# Patient Record
Sex: FEMALE | Race: WHITE | NOT HISPANIC OR LATINO | Employment: OTHER | URBAN - METROPOLITAN AREA
[De-identification: names, ages, dates, MRNs, and addresses within clinical notes are randomized per-mention and may not be internally consistent; named-entity substitution may affect disease eponyms.]

---

## 2019-05-22 ENCOUNTER — OFFICE VISIT (OUTPATIENT)
Dept: GASTROENTEROLOGY | Facility: CLINIC | Age: 84
End: 2019-05-22
Payer: MEDICARE

## 2019-05-22 VITALS
HEART RATE: 64 BPM | SYSTOLIC BLOOD PRESSURE: 147 MMHG | HEIGHT: 59 IN | DIASTOLIC BLOOD PRESSURE: 96 MMHG | WEIGHT: 84 LBS | BODY MASS INDEX: 16.93 KG/M2

## 2019-05-22 DIAGNOSIS — R11.0 CHRONIC NAUSEA: ICD-10-CM

## 2019-05-22 DIAGNOSIS — K57.30 DIVERTICULOSIS OF LARGE INTESTINE WITHOUT HEMORRHAGE: ICD-10-CM

## 2019-05-22 DIAGNOSIS — K59.04 CHRONIC IDIOPATHIC CONSTIPATION: Primary | ICD-10-CM

## 2019-05-22 DIAGNOSIS — K21.9 GASTROESOPHAGEAL REFLUX DISEASE WITHOUT ESOPHAGITIS: ICD-10-CM

## 2019-05-22 DIAGNOSIS — Z87.19 HISTORY OF ESOPHAGEAL STRICTURE: ICD-10-CM

## 2019-05-22 PROCEDURE — 99999 PR PBB SHADOW E&M-EST. PATIENT-LVL II: ICD-10-PCS | Mod: PBBFAC,,, | Performed by: INTERNAL MEDICINE

## 2019-05-22 PROCEDURE — 99203 PR OFFICE/OUTPT VISIT, NEW, LEVL III, 30-44 MIN: ICD-10-PCS | Mod: S$PBB,,, | Performed by: INTERNAL MEDICINE

## 2019-05-22 PROCEDURE — 99203 OFFICE O/P NEW LOW 30 MIN: CPT | Mod: S$PBB,,, | Performed by: INTERNAL MEDICINE

## 2019-05-22 PROCEDURE — 99212 OFFICE O/P EST SF 10 MIN: CPT | Mod: PBBFAC,PN | Performed by: INTERNAL MEDICINE

## 2019-05-22 PROCEDURE — 99999 PR PBB SHADOW E&M-EST. PATIENT-LVL II: CPT | Mod: PBBFAC,,, | Performed by: INTERNAL MEDICINE

## 2019-05-22 RX ORDER — ONDANSETRON 4 MG/1
4 TABLET, FILM COATED ORAL EVERY 8 HOURS PRN
COMMUNITY
End: 2019-05-22 | Stop reason: SDUPTHER

## 2019-05-22 RX ORDER — OMEPRAZOLE 20 MG/1
20 CAPSULE, DELAYED RELEASE ORAL DAILY
Qty: 90 CAPSULE | Refills: 1 | Status: SHIPPED | OUTPATIENT
Start: 2019-05-22 | End: 2020-03-02

## 2019-05-22 RX ORDER — OMEPRAZOLE 20 MG/1
20 CAPSULE, DELAYED RELEASE ORAL DAILY
COMMUNITY
End: 2019-05-22 | Stop reason: SDUPTHER

## 2019-05-22 RX ORDER — ONDANSETRON 4 MG/1
4 TABLET, FILM COATED ORAL EVERY 8 HOURS PRN
Qty: 100 TABLET | Refills: 1 | Status: SHIPPED | OUTPATIENT
Start: 2019-05-22 | End: 2020-08-25

## 2019-05-22 RX ORDER — SOTALOL HYDROCHLORIDE 80 MG/1
TABLET ORAL
Refills: 2 | COMMUNITY
Start: 2019-04-10

## 2019-05-22 RX ORDER — OXYCODONE AND ACETAMINOPHEN 10; 325 MG/1; MG/1
TABLET ORAL
Refills: 0 | COMMUNITY
Start: 2019-04-10

## 2019-05-22 RX ORDER — FAMOTIDINE 40 MG/1
40 TABLET, FILM COATED ORAL DAILY
COMMUNITY
End: 2019-05-22 | Stop reason: SDUPTHER

## 2019-05-22 RX ORDER — FUROSEMIDE 20 MG/1
20 TABLET ORAL 2 TIMES DAILY
COMMUNITY

## 2019-05-22 RX ORDER — BUDESONIDE AND FORMOTEROL FUMARATE DIHYDRATE 80; 4.5 UG/1; UG/1
AEROSOL RESPIRATORY (INHALATION)
COMMUNITY
Start: 2019-05-20

## 2019-05-22 RX ORDER — LEVOTHYROXINE SODIUM 88 UG/1
TABLET ORAL
COMMUNITY
Start: 2019-04-16

## 2019-05-22 RX ORDER — FAMOTIDINE 40 MG/1
40 TABLET, FILM COATED ORAL NIGHTLY
Qty: 90 TABLET | Refills: 1 | Status: SHIPPED | OUTPATIENT
Start: 2019-05-22 | End: 2020-03-23 | Stop reason: SDUPTHER

## 2019-05-22 RX ORDER — LEVOTHYROXINE SODIUM 75 UG/1
TABLET ORAL
Refills: 1 | COMMUNITY
Start: 2019-05-20

## 2019-05-22 RX ORDER — UMECLIDINIUM 62.5 UG/1
AEROSOL, POWDER ORAL
COMMUNITY
Start: 2019-05-20

## 2019-05-22 NOTE — PROGRESS NOTES
Subjective:       Patient ID: Katlyn Mathis is a 84 y.o. female.    Chief Complaint: No chief complaint on file.    She has become constipated.  In January she fell and had a fracture of her right wrist and femur and underwent surgery and was in the nursing home.  In the past the Linzess has worked but she does not have a prescription.  She is on the oxycodone for the pain. She is in the wheelchair today but she states that she is undergoing physical therapy and started walking with her walker.  She has a history gastroesophageal reflux and stricture.  The current medications have resolved her pyrosis and dyspepsia and she denies dysphagia aspiration jaundice hematemesis melena or hematochezia. She has was evaluated by her pulmonologist for COPD and her pulmonary nodules.  I have requested the labs and records.  She takes the omeprazole in the morning and the Pepcid at night.      Allergies:  Review of patient's allergies indicates:   Allergen Reactions    Adhesive     Chocolate flavor     Codeine     Cortisone     Flexeril [cyclobenzaprine]     Lidocaine     Niacin preparations     Pcn [penicillins]     Peanut     Sulfa (sulfonamide antibiotics)        Medications:    Current Outpatient Medications:     famotidine (PEPCID) 40 MG tablet, Take 1 tablet (40 mg total) by mouth nightly., Disp: 90 tablet, Rfl: 1    furosemide (LASIX) 20 MG tablet, Take 20 mg by mouth 2 (two) times daily., Disp: , Rfl:     INCRUSE ELLIPTA 62.5 mcg/actuation DsDv, , Disp: , Rfl:     levothyroxine (SYNTHROID) 75 MCG tablet, , Disp: , Rfl: 1    levothyroxine (SYNTHROID) 88 MCG tablet, , Disp: , Rfl:     omeprazole (PRILOSEC) 20 MG capsule, Take 1 capsule (20 mg total) by mouth once daily., Disp: 90 capsule, Rfl: 1    ondansetron (ZOFRAN) 4 MG tablet, Take 1 tablet (4 mg total) by mouth every 8 (eight) hours as needed for Nausea., Disp: 100 tablet, Rfl: 1    oxyCODONE-acetaminophen (PERCOCET)  mg per tablet, ,  Disp: , Rfl: 0    sotalol (BETAPACE) 80 MG tablet, , Disp: , Rfl: 2    SYMBICORT 80-4.5 mcg/actuation HFAA, , Disp: , Rfl:     History reviewed. No pertinent past medical history.    History reviewed. No pertinent surgical history.      Review of Systems   Constitutional: Positive for activity change. Negative for appetite change, fever and unexpected weight change.   HENT: Negative for trouble swallowing.         No jaundice.   Respiratory: Negative for cough, shortness of breath and wheezing.         She has a pulmonary inhaler.  She denies cardiopulmonary symptoms but she is not physically active.   Cardiovascular: Negative for chest pain.        She has history of atrial fibrillation.   Gastrointestinal: Positive for constipation. Negative for abdominal distention, abdominal pain, anal bleeding and blood in stool.        She complains of constipation and nausea at night.  She denies vomiting.  She denies a precipitating factor   Endocrine:        She states her hypothyroidism is well controlled   Genitourinary:        Nocturia   Musculoskeletal: Positive for arthralgias and back pain. Negative for neck pain.   Skin: Negative for pallor and rash.   Neurological: Negative for dizziness, seizures, syncope, speech difficulty, weakness and numbness.   Hematological: Negative for adenopathy.   Psychiatric/Behavioral: Negative for confusion.       Objective:      Physical Exam   Constitutional: She is oriented to person, place, and time.   Thin elderly well-hydrated nonicteric white female   HENT:   Head: Normocephalic.   Eyes: Pupils are equal, round, and reactive to light. EOM are normal.   Neck: Normal range of motion. Neck supple. No tracheal deviation present. No thyromegaly present.   Cardiovascular:   Irregular irregular rhythm   Pulmonary/Chest: Effort normal and breath sounds normal.   Abdominal: Soft. Bowel sounds are normal. She exhibits no distension and no mass. There is no tenderness. There is no  rebound and no guarding. No hernia.   The abdomen is on plane without tenderness masses or organomegaly.  Bowel sounds are normal.   Musculoskeletal:   She does do decreased movement of the right hip or right leg and the right wrist.  She has healed surgical scars   Lymphadenopathy:     She has no cervical adenopathy.   Neurological: She is alert and oriented to person, place, and time. No cranial nerve deficit.   Skin: Skin is warm and dry.   Psychiatric: She has a normal mood and affect. Her behavior is normal.   Vitals reviewed.        Plan:       Chronic idiopathic constipation    Diverticulosis of large intestine without hemorrhage    Gastroesophageal reflux disease without esophagitis    History of esophageal stricture    Other orders  -     famotidine (PEPCID) 40 MG tablet; Take 1 tablet (40 mg total) by mouth nightly.  Dispense: 90 tablet; Refill: 1  -     omeprazole (PRILOSEC) 20 MG capsule; Take 1 capsule (20 mg total) by mouth once daily.  Dispense: 90 capsule; Refill: 1  -     ondansetron (ZOFRAN) 4 MG tablet; Take 1 tablet (4 mg total) by mouth every 8 (eight) hours as needed for Nausea.  Dispense: 100 tablet; Refill: 1

## 2019-05-22 NOTE — PATIENT INSTRUCTIONS
She will continue her reflux regimen current medications.  She follows up with her pulmonologist.  She will continue her physical therapy and continue her ambulation program.  She is given samples of Linzess 290 for the constipation. I have requested the labs and the pulmonologist office visits.

## 2019-06-06 ENCOUNTER — TELEPHONE (OUTPATIENT)
Dept: GASTROENTEROLOGY | Facility: CLINIC | Age: 84
End: 2019-06-06

## 2019-06-06 NOTE — TELEPHONE ENCOUNTER
----- Message from Ingrid Tafoya RT sent at 6/6/2019  9:21 AM CDT -----  Contact: Nilton,Daughter,[phone]  Nilton,Daughter,[phone], would like the prescription of the pt's sample,Marquiszest, to be called in, please call to confirm, thanks.

## 2019-06-26 ENCOUNTER — OFFICE VISIT (OUTPATIENT)
Dept: GASTROENTEROLOGY | Facility: CLINIC | Age: 84
End: 2019-06-26
Payer: MEDICARE

## 2019-06-26 VITALS
OXYGEN SATURATION: 96 % | HEART RATE: 61 BPM | TEMPERATURE: 98 F | BODY MASS INDEX: 16.93 KG/M2 | HEIGHT: 59 IN | WEIGHT: 84 LBS | DIASTOLIC BLOOD PRESSURE: 74 MMHG | SYSTOLIC BLOOD PRESSURE: 122 MMHG

## 2019-06-26 DIAGNOSIS — K21.9 GASTROESOPHAGEAL REFLUX DISEASE WITHOUT ESOPHAGITIS: ICD-10-CM

## 2019-06-26 DIAGNOSIS — K59.04 CHRONIC IDIOPATHIC CONSTIPATION: Primary | ICD-10-CM

## 2019-06-26 DIAGNOSIS — K59.00 CONSTIPATION, UNSPECIFIED CONSTIPATION TYPE: ICD-10-CM

## 2019-06-26 PROCEDURE — 99999 PR PBB SHADOW E&M-EST. PATIENT-LVL III: ICD-10-PCS | Mod: PBBFAC,,, | Performed by: INTERNAL MEDICINE

## 2019-06-26 PROCEDURE — 99213 OFFICE O/P EST LOW 20 MIN: CPT | Mod: PBBFAC,PN | Performed by: INTERNAL MEDICINE

## 2019-06-26 PROCEDURE — 99999 PR PBB SHADOW E&M-EST. PATIENT-LVL III: CPT | Mod: PBBFAC,,, | Performed by: INTERNAL MEDICINE

## 2019-06-26 PROCEDURE — 99213 OFFICE O/P EST LOW 20 MIN: CPT | Mod: S$PBB,,, | Performed by: INTERNAL MEDICINE

## 2019-06-26 PROCEDURE — 99213 PR OFFICE/OUTPT VISIT, EST, LEVL III, 20-29 MIN: ICD-10-PCS | Mod: S$PBB,,, | Performed by: INTERNAL MEDICINE

## 2019-06-26 RX ORDER — RIVAROXABAN 15 MG/1
TABLET, FILM COATED ORAL
COMMUNITY
Start: 2019-06-11

## 2019-06-26 RX ORDER — ALBUTEROL SULFATE 1.25 MG/3ML
1.25 SOLUTION RESPIRATORY (INHALATION) EVERY 6 HOURS PRN
COMMUNITY
End: 2020-05-18

## 2019-06-26 NOTE — PROGRESS NOTES
Subjective:       Patient ID: Katlyn Mathis is a 84 y.o. female.    Chief Complaint: Follow-up    She is here with her family member who is her main caregiver who states she is eating well.  She is eating lot of fresh vegetables.  She denies dysphagia or associated coughing with oral ingested liquids or foods.  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  She is followed by the pulmonologist.  She ambulates with assistance.  She denies fever chills.  The Linzess a produced daily bowel movements.  She does not want further GI evaluation because she is doing so very well.  I have requested the records.      Allergies:  Review of patient's allergies indicates:   Allergen Reactions    Adhesive     Chocolate flavor     Codeine     Cortisone     Flexeril [cyclobenzaprine]     Lidocaine     Niacin preparations     Pcn [penicillins]     Peanut     Sulfa (sulfonamide antibiotics)        Medications:    Current Outpatient Medications:     albuterol (ACCUNEB) 1.25 mg/3 mL Nebu, Take 1.25 mg by nebulization every 6 (six) hours as needed. Rescue, Disp: , Rfl:     famotidine (PEPCID) 40 MG tablet, Take 1 tablet (40 mg total) by mouth nightly., Disp: 90 tablet, Rfl: 1    furosemide (LASIX) 20 MG tablet, Take 20 mg by mouth 2 (two) times daily., Disp: , Rfl:     INCRUSE ELLIPTA 62.5 mcg/actuation DsDv, , Disp: , Rfl:     levothyroxine (SYNTHROID) 75 MCG tablet, , Disp: , Rfl: 1    levothyroxine (SYNTHROID) 88 MCG tablet, , Disp: , Rfl:     omeprazole (PRILOSEC) 20 MG capsule, Take 1 capsule (20 mg total) by mouth once daily., Disp: 90 capsule, Rfl: 1    ondansetron (ZOFRAN) 4 MG tablet, Take 1 tablet (4 mg total) by mouth every 8 (eight) hours as needed for Nausea., Disp: 100 tablet, Rfl: 1    oxyCODONE-acetaminophen (PERCOCET)  mg per tablet, , Disp: , Rfl: 0    sotalol (BETAPACE) 80 MG tablet, , Disp: , Rfl:  2    SYMBICORT 80-4.5 mcg/actuation HFAA, , Disp: , Rfl:     XARELTO 15 mg Tab, , Disp: , Rfl:     linaCLOtide (LINZESS) 290 mcg Cap capsule, Take 1 capsule (290 mcg total) by mouth once daily., Disp: 30 capsule, Rfl: 2    History reviewed. No pertinent past medical history.    History reviewed. No pertinent surgical history.      Review of Systems   Constitutional: Positive for activity change. Negative for appetite change, fever and unexpected weight change.        She is not of she is not physically active because of her arthritis and decreased muscle mass with weakness.   HENT: Negative for trouble swallowing.         No jaundice.   Respiratory: Negative for cough, shortness of breath and wheezing.         She has chronic lung disease and is followed by the pulmonologist.  She has never been a smoker.  She denies dyspnea with exertion but she is not physically active  She denies aspiration or dysphagia.  She states the pulmonary inhalers have really helped her.   Cardiovascular: Negative for chest pain.   Gastrointestinal: Positive for constipation. Negative for abdominal distention, abdominal pain, anal bleeding, blood in stool and diarrhea.        She has history of diverticulosis and constipation.  The Linzess as produced daily bowel movements.  She has a history gastroesophageal reflux and dysphagia and she was dilated years ago.  She denies dysphagia aspiration jaundice or bleeding.  She is able to eat foods including vegetables without aspiration such as coughing or dyspnea. She experienced nausea but now that the constipation is resolved the nausea has resolved.  She denies vomiting   Musculoskeletal: Positive for arthralgias and back pain. Negative for neck pain.   Skin: Negative for pallor and rash.   Neurological: Negative for dizziness, seizures, syncope, speech difficulty, weakness and numbness.   Hematological: Negative for adenopathy.   Psychiatric/Behavioral: Negative for confusion.        Objective:      Physical Exam   Constitutional: She is oriented to person, place, and time.   Thin elderly nonicteric white female   HENT:   Head: Normocephalic.   Eyes: Pupils are equal, round, and reactive to light. EOM are normal.   Neck: Normal range of motion. Neck supple. No tracheal deviation present. No thyromegaly present.   Cardiovascular: Normal rate, regular rhythm and normal heart sounds.   Pulmonary/Chest: Effort normal and breath sounds normal.   Abdominal: Soft. Bowel sounds are normal. She exhibits no distension and no mass. There is no tenderness. There is no rebound and no guarding. No hernia.   On plane without tenderness masses organomegaly.  Bowel sounds are normal.   Musculoskeletal:   She is in the wheelchair.  She cannot go from the sitting to the standing position without assistance.   Lymphadenopathy:     She has no cervical adenopathy.   Neurological: She is alert and oriented to person, place, and time. No cranial nerve deficit.   Skin: Skin is warm and dry.   Psychiatric: She has a normal mood and affect. Her behavior is normal.   Vitals reviewed.        Plan:       Chronic idiopathic constipation  -     linaCLOtide (LINZESS) 290 mcg Cap capsule; Take 1 capsule (290 mcg total) by mouth once daily.  Dispense: 30 capsule; Refill: 2    Constipation, unspecified constipation type    Gastroesophageal reflux disease without esophagitis

## 2019-06-26 NOTE — PATIENT INSTRUCTIONS
She will continue her reflux regimen current medications.  She will continue her diet with additional fresh fruits.  And vitamins and minerals.  The Linzess is produced daily bowel movements at this point further GI evaluation is not indicated.  She will follow up with her pulmonologist who has ordered a video fluoroscopy.  She will let me know when she has the testing so I can review the it records.

## 2019-09-26 ENCOUNTER — OFFICE VISIT (OUTPATIENT)
Dept: GASTROENTEROLOGY | Facility: CLINIC | Age: 84
End: 2019-09-26
Payer: MEDICARE

## 2019-09-26 VITALS
SYSTOLIC BLOOD PRESSURE: 140 MMHG | BODY MASS INDEX: 17.14 KG/M2 | DIASTOLIC BLOOD PRESSURE: 71 MMHG | OXYGEN SATURATION: 95 % | HEIGHT: 59 IN | WEIGHT: 85 LBS | RESPIRATION RATE: 16 BRPM | HEART RATE: 59 BPM

## 2019-09-26 DIAGNOSIS — K57.30 DIVERTICULOSIS OF LARGE INTESTINE WITHOUT HEMORRHAGE: ICD-10-CM

## 2019-09-26 DIAGNOSIS — Z87.19 HISTORY OF ESOPHAGEAL STRICTURE: Primary | ICD-10-CM

## 2019-09-26 DIAGNOSIS — K59.00 CONSTIPATION, UNSPECIFIED CONSTIPATION TYPE: ICD-10-CM

## 2019-09-26 DIAGNOSIS — K21.9 GASTROESOPHAGEAL REFLUX DISEASE WITHOUT ESOPHAGITIS: ICD-10-CM

## 2019-09-26 PROCEDURE — 99213 PR OFFICE/OUTPT VISIT, EST, LEVL III, 20-29 MIN: ICD-10-PCS | Mod: S$PBB,,, | Performed by: INTERNAL MEDICINE

## 2019-09-26 PROCEDURE — 99213 OFFICE O/P EST LOW 20 MIN: CPT | Mod: PBBFAC,PN | Performed by: INTERNAL MEDICINE

## 2019-09-26 PROCEDURE — 99999 PR PBB SHADOW E&M-EST. PATIENT-LVL III: CPT | Mod: PBBFAC,,, | Performed by: INTERNAL MEDICINE

## 2019-09-26 PROCEDURE — 99213 OFFICE O/P EST LOW 20 MIN: CPT | Mod: S$PBB,,, | Performed by: INTERNAL MEDICINE

## 2019-09-26 PROCEDURE — 99999 PR PBB SHADOW E&M-EST. PATIENT-LVL III: ICD-10-PCS | Mod: PBBFAC,,, | Performed by: INTERNAL MEDICINE

## 2019-09-26 RX ORDER — AMLODIPINE BESYLATE 5 MG/1
TABLET ORAL
Refills: 4 | COMMUNITY
Start: 2019-09-03

## 2019-09-26 NOTE — PATIENT INSTRUCTIONS
She is doing extremely well.  The Linzess is resolved the constipation.  She uses the PPI in the morning the ranitidine at night.  She denies dysphagia upper GI symptoms.  She has pending labs.  She is followed by the cardiologist.  I have requested labs.  She continues her current diet for vitamins and minerals.

## 2019-09-27 NOTE — PROGRESS NOTES
Subjective:       Patient ID: Katlyn Mathis is a 85 y.o. female.    Chief Complaint: Follow-up    She is swallowing well. Her granddaughter is the main caregiver.  The granddaughter's preparing the food and states that she is eating very well. She denies aspiration.  She denies dysphagia hematemesis hematochezia jaundice or bleeding.  The PPI has resolved her upper GI symptoms.  She is having daily bowel movements with her current medications.  She has labs scheduled with her cardiologist.  She ambulates at home with a walker although the daughter brought her into the office with a wheelchair because I did more her to fall .  At home she uses the wheelchair but knows the area and does not fall.  She complains of chronic back and arthritic pain. She takes her medications as prescribed.  The granddaughter provides protein supplements and also vitamin and mineral supplements      Allergies:  Review of patient's allergies indicates:   Allergen Reactions    Adhesive     Chocolate flavor     Codeine     Cortisone     Flexeril [cyclobenzaprine]     Lidocaine     Niacin preparations     Pcn [penicillins]     Peanut     Sulfa (sulfonamide antibiotics)        Medications:    Current Outpatient Medications:     albuterol (ACCUNEB) 1.25 mg/3 mL Nebu, Take 1.25 mg by nebulization every 6 (six) hours as needed. Rescue, Disp: , Rfl:     amLODIPine (NORVASC) 5 MG tablet, , Disp: , Rfl: 4    famotidine (PEPCID) 40 MG tablet, Take 1 tablet (40 mg total) by mouth nightly., Disp: 90 tablet, Rfl: 1    furosemide (LASIX) 20 MG tablet, Take 20 mg by mouth 2 (two) times daily., Disp: , Rfl:     INCRUSE ELLIPTA 62.5 mcg/actuation DsDv, , Disp: , Rfl:     levothyroxine (SYNTHROID) 75 MCG tablet, , Disp: , Rfl: 1    levothyroxine (SYNTHROID) 88 MCG tablet, , Disp: , Rfl:     omeprazole (PRILOSEC) 20 MG capsule, Take 1 capsule (20 mg total) by mouth once daily., Disp: 90 capsule, Rfl: 1    ondansetron (ZOFRAN) 4 MG  tablet, Take 1 tablet (4 mg total) by mouth every 8 (eight) hours as needed for Nausea., Disp: 100 tablet, Rfl: 1    oxyCODONE-acetaminophen (PERCOCET)  mg per tablet, , Disp: , Rfl: 0    sotalol (BETAPACE) 80 MG tablet, , Disp: , Rfl: 2    SYMBICORT 80-4.5 mcg/actuation HFAA, , Disp: , Rfl:     XARELTO 15 mg Tab, , Disp: , Rfl:     History reviewed. No pertinent past medical history.    History reviewed. No pertinent surgical history.      Review of Systems   Constitutional: Negative for appetite change, fever and unexpected weight change.   HENT: Negative for trouble swallowing.         No jaundice.   Respiratory: Negative for cough, shortness of breath and wheezing.         She denies chronic cough dyspnea on exertion aspiration or chronic sputum production.  She denies hemoptysis.   Cardiovascular: Negative for chest pain.        She denies exertional chest pain or or rhythm disturbance.  She is not particularly physically active.   Gastrointestinal: Positive for constipation. Negative for abdominal distention, abdominal pain, anal bleeding, blood in stool, diarrhea, nausea, rectal pain and vomiting.   Musculoskeletal: Positive for arthralgias, back pain and neck stiffness. Negative for neck pain.   Skin: Negative for pallor and rash.   Neurological: Negative for dizziness, seizures, syncope, speech difficulty, weakness and numbness.   Hematological: Negative for adenopathy.   Psychiatric/Behavioral: Negative for confusion.       Objective:      Physical Exam   Constitutional: She is oriented to person, place, and time. She appears well-developed and well-nourished.   Thin elderly nonicteric white female   HENT:   Head: Normocephalic.   Eyes: Pupils are equal, round, and reactive to light. EOM are normal.   Neck: Normal range of motion. Neck supple. No tracheal deviation present. No thyromegaly present.   Cardiovascular: Normal rate, regular rhythm and normal heart sounds.   Pulmonary/Chest: Effort  normal and breath sounds normal.   Abdominal: Soft. Bowel sounds are normal. She exhibits no distension and no mass. There is no tenderness. There is no rebound and no guarding. No hernia.   The abdomen is soft is on plane without organomegaly tenderness or masses.  Bowel sounds are normal.   Musculoskeletal: Normal range of motion.   She is in the wheelchair.  She can get out of the wheelchair with assistance.  She she has difficulty going from the sitting to the standing position. Her gait is slow slow   Lymphadenopathy:     She has no cervical adenopathy.   Neurological: She is alert and oriented to person, place, and time. No cranial nerve deficit.   Skin: Skin is warm and dry.   Psychiatric: She has a normal mood and affect. Her behavior is normal.   Vitals reviewed.        Plan:       History of esophageal stricture    Gastroesophageal reflux disease without esophagitis    Constipation, unspecified constipation type    Diverticulosis of large intestine without hemorrhage     She will continue current medications vitamins and minerals.  She continues her reflux regimen.  She continues her nutritious diet.  Her granddaughter is the caregiver.  She will follow-up with her other physicians.  She will be supervised when she is using the walker.

## 2019-10-07 DIAGNOSIS — K59.04 CHRONIC IDIOPATHIC CONSTIPATION: ICD-10-CM

## 2019-10-07 RX ORDER — LINACLOTIDE 290 UG/1
CAPSULE, GELATIN COATED ORAL
Qty: 30 CAPSULE | Refills: 2 | Status: SHIPPED | OUTPATIENT
Start: 2019-10-07

## 2020-03-02 RX ORDER — OMEPRAZOLE 20 MG/1
CAPSULE, DELAYED RELEASE ORAL
Qty: 90 CAPSULE | Refills: 0 | Status: SHIPPED | OUTPATIENT
Start: 2020-03-02 | End: 2020-03-23 | Stop reason: SDUPTHER

## 2020-03-23 DIAGNOSIS — K21.9 GASTROESOPHAGEAL REFLUX DISEASE WITHOUT ESOPHAGITIS: Primary | ICD-10-CM

## 2020-03-23 RX ORDER — OMEPRAZOLE 20 MG/1
20 CAPSULE, DELAYED RELEASE ORAL DAILY
Qty: 90 CAPSULE | Refills: 3 | Status: SHIPPED | OUTPATIENT
Start: 2020-03-23 | End: 2020-05-18 | Stop reason: SDUPTHER

## 2020-03-23 RX ORDER — FAMOTIDINE 40 MG/1
40 TABLET, FILM COATED ORAL NIGHTLY
Qty: 90 TABLET | Refills: 1 | Status: SHIPPED | OUTPATIENT
Start: 2020-03-23 | End: 2020-05-18 | Stop reason: SDUPTHER

## 2020-05-18 ENCOUNTER — OFFICE VISIT (OUTPATIENT)
Dept: GASTROENTEROLOGY | Facility: CLINIC | Age: 85
End: 2020-05-18
Payer: MEDICARE

## 2020-05-18 VITALS
OXYGEN SATURATION: 98 % | HEIGHT: 59 IN | BODY MASS INDEX: 18.55 KG/M2 | DIASTOLIC BLOOD PRESSURE: 60 MMHG | WEIGHT: 92 LBS | RESPIRATION RATE: 18 BRPM | SYSTOLIC BLOOD PRESSURE: 113 MMHG | HEART RATE: 60 BPM

## 2020-05-18 DIAGNOSIS — Z87.19 HISTORY OF ESOPHAGEAL STRICTURE: ICD-10-CM

## 2020-05-18 DIAGNOSIS — E53.8 B12 DEFICIENCY: Primary | ICD-10-CM

## 2020-05-18 DIAGNOSIS — K57.30 DIVERTICULOSIS OF LARGE INTESTINE WITHOUT HEMORRHAGE: ICD-10-CM

## 2020-05-18 DIAGNOSIS — K21.9 GASTROESOPHAGEAL REFLUX DISEASE WITHOUT ESOPHAGITIS: ICD-10-CM

## 2020-05-18 DIAGNOSIS — K59.00 CONSTIPATION, UNSPECIFIED CONSTIPATION TYPE: ICD-10-CM

## 2020-05-18 DIAGNOSIS — K59.04 CHRONIC IDIOPATHIC CONSTIPATION: ICD-10-CM

## 2020-05-18 PROCEDURE — 99999 PR PBB SHADOW E&M-EST. PATIENT-LVL III: ICD-10-PCS | Mod: PBBFAC,,, | Performed by: INTERNAL MEDICINE

## 2020-05-18 PROCEDURE — 96372 THER/PROPH/DIAG INJ SC/IM: CPT | Mod: PBBFAC,PN

## 2020-05-18 PROCEDURE — 99213 PR OFFICE/OUTPT VISIT, EST, LEVL III, 20-29 MIN: ICD-10-PCS | Mod: S$PBB,,, | Performed by: INTERNAL MEDICINE

## 2020-05-18 PROCEDURE — 99999 PR PBB SHADOW E&M-EST. PATIENT-LVL III: CPT | Mod: PBBFAC,,, | Performed by: INTERNAL MEDICINE

## 2020-05-18 PROCEDURE — 99213 OFFICE O/P EST LOW 20 MIN: CPT | Mod: S$PBB,,, | Performed by: INTERNAL MEDICINE

## 2020-05-18 PROCEDURE — 99213 OFFICE O/P EST LOW 20 MIN: CPT | Mod: PBBFAC,PN | Performed by: INTERNAL MEDICINE

## 2020-05-18 RX ORDER — CYANOCOBALAMIN 1000 UG/ML
1000 INJECTION, SOLUTION INTRAMUSCULAR; SUBCUTANEOUS
Status: COMPLETED | OUTPATIENT
Start: 2020-05-18 | End: 2020-05-18

## 2020-05-18 RX ORDER — OMEPRAZOLE 20 MG/1
20 CAPSULE, DELAYED RELEASE ORAL DAILY
Qty: 90 CAPSULE | Refills: 3 | Status: SHIPPED | OUTPATIENT
Start: 2020-05-18 | End: 2021-06-15

## 2020-05-18 RX ORDER — FAMOTIDINE 40 MG/1
40 TABLET, FILM COATED ORAL NIGHTLY
Qty: 90 TABLET | Refills: 3 | Status: SHIPPED | OUTPATIENT
Start: 2020-05-18 | End: 2020-10-02 | Stop reason: SDUPTHER

## 2020-05-18 RX ORDER — ALBUTEROL SULFATE 0.83 MG/ML
SOLUTION RESPIRATORY (INHALATION)
COMMUNITY
Start: 2020-02-28

## 2020-05-18 RX ADMIN — CYANOCOBALAMIN 1000 MCG: 1000 INJECTION INTRAMUSCULAR; SUBCUTANEOUS at 11:05

## 2020-05-18 NOTE — PATIENT INSTRUCTIONS
She will continue the current medications and the reflux regimen.  She continues her nutritious diet.  She is given the vitamin B12.  The Linzess has been stopped and she is taking the OTC laxatives which has produced daily bowel movements.  She follows up with her pulmonologist and I have requested her [facility] records and labs.

## 2020-05-18 NOTE — PROGRESS NOTES
Subjective:       Patient ID: Katlyn Mathis is a 85 y.o. female.    Chief Complaint: Follow-up (6m)    She is here with her family member who is the main caregiver.  She has gained 10 lb because she is eating well.  She has a history gastroesophageal reflux and esophageal stricture.  She denies pyrosis or dyspepsia.  She denies dysphagia.  Her current medications have resolved her upper GI symptoms.  The Linzess was too expensive.  She is using the OTC laxative and is having daily bowel movements without straining.  She denies hematemesis hematochezia jaundice or bleeding.  She generally eats veggies and ingest few meats.  She generally receives her vitamin B12 IM..  She is not particularly physically active and is in the wheelchair.  She has difficulty with ambulation because of her severe chronic pain syndrome and arthritis.  She is followed by the pulmonologist.  She has ambulatory oxygen therapy.  She has not had aspiration or hemoptysis.  She is not particularly physically active and she denies dyspnea on exertion.      Allergies:  Review of patient's allergies indicates:   Allergen Reactions    Adhesive     Chocolate flavor     Codeine     Cortisone     Flexeril [cyclobenzaprine]     Lidocaine     Niacin preparations     Pcn [penicillins]     Peanut     Sulfa (sulfonamide antibiotics)        Medications:    Current Outpatient Medications:     albuterol (PROVENTIL) 2.5 mg /3 mL (0.083 %) nebulizer solution, USE 1 VIAL IN NEBULIZER Q 6 H PRN FOR WHEEZING / SOB, Disp: , Rfl:     amLODIPine (NORVASC) 5 MG tablet, , Disp: , Rfl: 4    famotidine (PEPCID) 40 MG tablet, Take 1 tablet (40 mg total) by mouth nightly., Disp: 90 tablet, Rfl: 1    furosemide (LASIX) 20 MG tablet, Take 20 mg by mouth 2 (two) times daily., Disp: , Rfl:     INCRUSE ELLIPTA 62.5 mcg/actuation DsDv, , Disp: , Rfl:     levothyroxine (SYNTHROID) 75 MCG tablet, , Disp: , Rfl: 1    levothyroxine (SYNTHROID) 88 MCG tablet, ,  Disp: , Rfl:     LINZESS 290 mcg Cap capsule, TAKE 1 CAPSULE ONCE DAILY, Disp: 30 capsule, Rfl: 2    omeprazole (PRILOSEC) 20 MG capsule, Take 1 capsule (20 mg total) by mouth once daily., Disp: 90 capsule, Rfl: 3    ondansetron (ZOFRAN) 4 MG tablet, Take 1 tablet (4 mg total) by mouth every 8 (eight) hours as needed for Nausea., Disp: 100 tablet, Rfl: 1    oxyCODONE-acetaminophen (PERCOCET)  mg per tablet, , Disp: , Rfl: 0    sotalol (BETAPACE) 80 MG tablet, , Disp: , Rfl: 2    SYMBICORT 80-4.5 mcg/actuation HFAA, , Disp: , Rfl:     XARELTO 15 mg Tab, , Disp: , Rfl:     Current Facility-Administered Medications:     cyanocobalamin injection 1,000 mcg, 1,000 mcg, Intramuscular, 1 time in Clinic/HOD, Benjamin Pinto MD    History reviewed. No pertinent past medical history.    History reviewed. No pertinent surgical history.      Review of Systems   Constitutional: Negative for appetite change, fever and unexpected weight change.   HENT: Negative for trouble swallowing.         No jaundice.   Respiratory: Negative for cough, shortness of breath and wheezing.         She has never used tobacco.  She denies current usage of alcohol or tobacco.  She has chronic lung disease.  She is followed by the pulmonologist.  She has ambulatory oxygen therapy.  She ambulates and on a limited basis with her family assisting with her walking.  She generally in the wheelchair.  She denies aspiration or hemoptysis.   Cardiovascular: Negative for chest pain.        She denies exertional chest pain rhythm disturbance.  Her blood pressure is well controlled with current medications.   Gastrointestinal: Positive for constipation. Negative for abdominal distention, abdominal pain, anal bleeding, blood in stool and diarrhea.        She denies abdominal pain.  She does not want further upper endoscopy and colonoscopy because she is doing very well with her current medications.   Endocrine:        She has history of hypothyroidism  and states that the thyroid replacement has produced daily and labs.   Musculoskeletal: Positive for arthralgias, back pain, gait problem, myalgias, neck pain and neck stiffness.   Skin: Negative for pallor and rash.   Neurological: Negative for dizziness, seizures, syncope, speech difficulty, weakness and numbness.   Hematological: Negative for adenopathy.   Psychiatric/Behavioral: Negative for confusion.       Objective:      Physical Exam   Constitutional: She is oriented to person, place, and time. She appears well-nourished.   Well-nourished well-hydrated elderly nonicteric white female.  She is afebrile.  She is oriented x3.  She can relate her history and answer questions   HENT:   Head: Normocephalic.   Eyes: Pupils are equal, round, and reactive to light. EOM are normal.   Neck: Normal range of motion. Neck supple. No tracheal deviation present. No thyromegaly present.   Cardiovascular: Normal rate, regular rhythm and normal heart sounds.   Pulmonary/Chest: Effort normal and breath sounds normal.   Abdominal: Soft. Bowel sounds are normal. She exhibits no distension and no mass. There is no tenderness. There is no rebound and no guarding. No hernia.   Musculoskeletal:   She is in the wheelchair.  She extremities without difficulty.  She does not want to walk today because she feels that she has a 2 week.   Lymphadenopathy:     She has no cervical adenopathy.   Neurological: She is alert and oriented to person, place, and time. No cranial nerve deficit.   Skin: Skin is warm and dry.   Psychiatric: She has a normal mood and affect. Her behavior is normal.   Vitals reviewed.        Plan:       B12 deficiency  -     cyanocobalamin injection 1,000 mcg    Gastroesophageal reflux disease without esophagitis    History of esophageal stricture    Constipation, unspecified constipation type    Diverticulosis of large intestine without hemorrhage    Chronic idiopathic constipation     She will continue her reflux  regimen current medications.  She follows up with the pulmonologist and her PCP.  She is given the vitamin B12.  I have requested her previous records and labs.  She will add more fiber to the diet.  She can use the OTC laxatives but I have suggested additional fiber and possibly try the MiraLax.

## 2020-08-25 RX ORDER — ONDANSETRON 4 MG/1
TABLET, FILM COATED ORAL
Qty: 100 TABLET | Refills: 1 | Status: SHIPPED | OUTPATIENT
Start: 2020-08-25 | End: 2021-09-16

## 2020-10-02 DIAGNOSIS — K21.9 GASTROESOPHAGEAL REFLUX DISEASE WITHOUT ESOPHAGITIS: ICD-10-CM

## 2020-10-02 RX ORDER — FAMOTIDINE 40 MG/1
40 TABLET, FILM COATED ORAL NIGHTLY
Qty: 90 TABLET | Refills: 3 | Status: SHIPPED | OUTPATIENT
Start: 2020-10-02

## 2021-06-15 DIAGNOSIS — K21.9 GASTROESOPHAGEAL REFLUX DISEASE WITHOUT ESOPHAGITIS: ICD-10-CM

## 2021-06-15 RX ORDER — OMEPRAZOLE 20 MG/1
CAPSULE, DELAYED RELEASE ORAL
Qty: 90 CAPSULE | Refills: 3 | Status: SHIPPED | OUTPATIENT
Start: 2021-06-15

## 2021-09-16 RX ORDER — ONDANSETRON 4 MG/1
TABLET, FILM COATED ORAL
Qty: 30 TABLET | Refills: 1 | Status: SHIPPED | OUTPATIENT
Start: 2021-09-16 | End: 2022-05-02

## 2022-05-02 RX ORDER — ONDANSETRON 4 MG/1
TABLET, FILM COATED ORAL
Qty: 30 TABLET | Refills: 1 | Status: SHIPPED | OUTPATIENT
Start: 2022-05-02 | End: 2022-06-06

## 2022-06-06 RX ORDER — ONDANSETRON 4 MG/1
TABLET, FILM COATED ORAL
Qty: 30 TABLET | Refills: 1 | Status: SHIPPED | OUTPATIENT
Start: 2022-06-06